# Patient Record
Sex: FEMALE | Race: WHITE | Employment: UNEMPLOYED | URBAN - NONMETROPOLITAN AREA
[De-identification: names, ages, dates, MRNs, and addresses within clinical notes are randomized per-mention and may not be internally consistent; named-entity substitution may affect disease eponyms.]

---

## 2022-06-23 ENCOUNTER — HOSPITAL ENCOUNTER (EMERGENCY)
Age: 27
Discharge: LEFT BEFORE DISCHARGE | End: 2022-06-23

## 2022-06-23 VITALS
BODY MASS INDEX: 40.29 KG/M2 | RESPIRATION RATE: 20 BRPM | TEMPERATURE: 98 F | OXYGEN SATURATION: 100 % | HEIGHT: 64 IN | WEIGHT: 236 LBS | DIASTOLIC BLOOD PRESSURE: 76 MMHG | SYSTOLIC BLOOD PRESSURE: 110 MMHG | HEART RATE: 91 BPM

## 2022-06-23 NOTE — ED TRIAGE NOTES
Pt reports pain in lower back near tail bone that radiates down both legs. Denies any injury or trauma. Pt also reports she is 15 weeks pregnant.

## 2022-08-10 ENCOUNTER — APPOINTMENT (OUTPATIENT)
Dept: GENERAL RADIOLOGY | Age: 27
End: 2022-08-10
Attending: EMERGENCY MEDICINE
Payer: MEDICAID

## 2022-08-10 ENCOUNTER — HOSPITAL ENCOUNTER (EMERGENCY)
Age: 27
Discharge: HOME | End: 2022-08-10
Attending: EMERGENCY MEDICINE
Payer: MEDICAID

## 2022-08-10 VITALS
SYSTOLIC BLOOD PRESSURE: 138 MMHG | TEMPERATURE: 98.3 F | HEART RATE: 105 BPM | RESPIRATION RATE: 20 BRPM | DIASTOLIC BLOOD PRESSURE: 78 MMHG | BODY MASS INDEX: 45.79 KG/M2 | WEIGHT: 242.51 LBS | HEIGHT: 61 IN | OXYGEN SATURATION: 98 %

## 2022-08-10 DIAGNOSIS — M72.2 PLANTAR FASCIITIS: Primary | ICD-10-CM

## 2022-08-10 PROCEDURE — 73600 X-RAY EXAM OF ANKLE: CPT

## 2022-08-10 PROCEDURE — 99283 EMERGENCY DEPT VISIT LOW MDM: CPT

## 2022-08-11 NOTE — ED PROVIDER NOTES
EMERGENCY DEPARTMENT HISTORY AND PHYSICAL EXAM  ?    Date: 8/10/2022  Patient Name: Angelica Whittaker    History of Presenting Illness    Patient presents with: Ankle Injury      History Provided By: Patient    HPI: Angelica Whittaker, 33 y.o. female who is 22 weeks pregnant with a past medical history significant for bipolar and schizophrenia presents to the ED with cc of left ankle and heel pain for about a month. There is no specific trauma. Pain is worse with weightbearing and gets worse throughout the day. Pain is near the insertion of the Achilles tendon and also bottom of the heel. There are no other complaints, changes, or physical findings at this time. PCP: Fredy Lux NP    No current facility-administered medications on file prior to encounter. Current Outpatient Medications on File Prior to Encounter:  [DISCONTINUED] ziprasidone hcl (GEODON) 80 mg capsule, Take 80 mg by mouth three (3) times daily. , Disp: , Rfl:   [DISCONTINUED] oxcarbazepine (TRILEPTAL) 150 mg tablet, Take 300 mg by mouth three (3) times daily. , Disp: , Rfl:   [DISCONTINUED] methylphenidate (RITALIN SR) 20 mg SR capsule, Take 30 mg by mouth three (3) times daily. , Disp: , Rfl:         Past History    Past Medical History:  Past Medical History:  No date: ADHD (attention deficit hyperactivity disorder)  No date: Asthma  No date: Bipolar affective ([facility] [address]  No date: Psychiatric disorder  No date: Seizure [facility])    Past Surgical History:  No past surgical history on file. Family History:  History reviewed. No pertinent family history.       Social History:  Social History    Tobacco Use      Smoking status: Never      Smokeless tobacco: Never    Alcohol use: No    Drug use: No      Allergies:   -- Bee Sting [Sting, Bee] -- Unknown (comments)   -- Milk -- Unknown (comments)   -- Pollen, Micronized -- Unknown (comments)      Review of Systems  [unfilled]    Physical Exam  [unfilled]    Diagnostic Study Results    Labs - No results found for this or any previous visit (from the past 12 hour(s)). Radiologic Studies -   XR ANKLE LT AP/LAT   Final Result        No acute abnormality. CT Results  (Last 48 hours)    None      CXR Results  (Last 48 hours)    None          Medical Decision Making  I am the first provider for this patient. I reviewed the vital signs, available nursing notes, past medical history, past surgical history, family history and social history. Vital Signs-Reviewed the patient's vital signs. Empty flowsheet group. Records Reviewed: Nursing Notes    Provider Notes (Medical Decision Making):   Imaging is deferred. ED Course:   Initial assessment performed. The patients presenting problems have been discussed, and they are in agreement with the care plan formulated and outlined with them. I have encouraged them to ask questions as they arise throughout their visit. PLAN:  1. There are no discharge medications for this patient. 2. Follow-up Information    None     Return to ED if worse     Diagnosis    Clinical Impression: Plantar fasciitis  (primary encounter diagnosis)      ? Past Medical History:   Diagnosis Date    ADHD (attention deficit hyperactivity disorder)     Asthma     Bipolar affective ([facility] [address]     Psychiatric disorder     Seizure ([facility] [address]        No past surgical history on file. History reviewed. No pertinent family history.     Social History     Socioeconomic History    Marital status: SINGLE     Spouse name: Not on file    Number of children: Not on file    Years of education: Not on file    Highest education level: Not on file   Occupational History    Not on file   Tobacco Use    Smoking status: Never    Smokeless tobacco: Never   Substance and Sexual Activity    Alcohol use: No    Drug use: No    Sexual activity: Never   Other Topics Concern    Not on file   Social History Narrative    Not on file     Social Determinants of Health     Financial Resource Strain: Not on file   Food Insecurity: Not on file   Transportation Needs: Not on file   Physical Activity: Not on file   Stress: Not on file   Social Connections: Not on file   Intimate Partner Violence: Not on file   Housing Stability: Not on file         ALLERGIES: Bee sting [sting, bee]; Milk; and Pollen, micronized    Review of Systems   Constitutional: Negative. HENT: Negative. Musculoskeletal:         Left heel pain   Skin: Negative. Hematological: Negative. Vitals:    08/10/22 1946   BP: 138/78   Pulse: (!) 105   Resp: 20   Temp: 98.3 °F (36.8 °C)   SpO2: 98%   Weight: 110 kg (242 lb 8.1 oz)   Height: 5' 1\" (1.549 m)            Physical Exam  Vitals and nursing note reviewed. Constitutional:       Appearance: She is obese. HENT:      Head: Normocephalic and atraumatic. Eyes:      Conjunctiva/sclera: Conjunctivae normal.      Pupils: Pupils are equal, round, and reactive to light. Musculoskeletal:         General: Tenderness present. No swelling or deformity. Comments: Tenderness is present in the bottom of the heel and at the insertion of Achilles tendon. Neurological:      Mental Status: She is alert. MDM  Risk of Complications, Morbidity, and/or Mortality  General comments: Differential diagnosis includes Planter fasciitis and Achilles enthesitis.            Procedures